# Patient Record
Sex: FEMALE | Race: WHITE | NOT HISPANIC OR LATINO
[De-identification: names, ages, dates, MRNs, and addresses within clinical notes are randomized per-mention and may not be internally consistent; named-entity substitution may affect disease eponyms.]

---

## 2017-02-16 ENCOUNTER — APPOINTMENT (OUTPATIENT)
Dept: OBGYN | Facility: CLINIC | Age: 37
End: 2017-02-16

## 2017-02-16 VITALS
DIASTOLIC BLOOD PRESSURE: 68 MMHG | WEIGHT: 159 LBS | BODY MASS INDEX: 27.14 KG/M2 | SYSTOLIC BLOOD PRESSURE: 100 MMHG | HEIGHT: 64 IN

## 2017-02-20 LAB — HPV HIGH+LOW RISK DNA PNL CVX: POSITIVE

## 2017-02-22 LAB — CYTOLOGY CVX/VAG DOC THIN PREP: NORMAL

## 2017-02-23 ENCOUNTER — APPOINTMENT (OUTPATIENT)
Dept: OBGYN | Facility: CLINIC | Age: 37
End: 2017-02-23

## 2017-03-18 ENCOUNTER — APPOINTMENT (OUTPATIENT)
Dept: OBGYN | Facility: CLINIC | Age: 37
End: 2017-03-18

## 2017-07-06 ENCOUNTER — APPOINTMENT (OUTPATIENT)
Dept: OBGYN | Facility: CLINIC | Age: 37
End: 2017-07-06

## 2017-07-15 ENCOUNTER — APPOINTMENT (OUTPATIENT)
Dept: OBGYN | Facility: CLINIC | Age: 37
End: 2017-07-15

## 2017-11-27 ENCOUNTER — RX RENEWAL (OUTPATIENT)
Age: 37
End: 2017-11-27

## 2018-03-05 ENCOUNTER — APPOINTMENT (OUTPATIENT)
Dept: OBGYN | Facility: CLINIC | Age: 38
End: 2018-03-05
Payer: COMMERCIAL

## 2018-03-05 VITALS
SYSTOLIC BLOOD PRESSURE: 110 MMHG | HEIGHT: 64 IN | DIASTOLIC BLOOD PRESSURE: 60 MMHG | WEIGHT: 165 LBS | BODY MASS INDEX: 28.17 KG/M2

## 2018-03-05 DIAGNOSIS — Z01.419 ENCOUNTER FOR GYNECOLOGICAL EXAMINATION (GENERAL) (ROUTINE) W/OUT ABNORMAL FINDINGS: ICD-10-CM

## 2018-03-05 PROCEDURE — 99395 PREV VISIT EST AGE 18-39: CPT

## 2018-03-06 LAB — HPV HIGH+LOW RISK DNA PNL CVX: DETECTED

## 2018-03-08 ENCOUNTER — TRANSCRIPTION ENCOUNTER (OUTPATIENT)
Age: 38
End: 2018-03-08

## 2018-03-09 LAB — CYTOLOGY CVX/VAG DOC THIN PREP: NORMAL

## 2018-03-19 ENCOUNTER — APPOINTMENT (OUTPATIENT)
Dept: OBGYN | Facility: CLINIC | Age: 38
End: 2018-03-19
Payer: COMMERCIAL

## 2018-03-19 ENCOUNTER — ASOB RESULT (OUTPATIENT)
Age: 38
End: 2018-03-19

## 2018-03-19 PROCEDURE — 76857 US EXAM PELVIC LIMITED: CPT

## 2018-03-19 PROCEDURE — 76830 TRANSVAGINAL US NON-OB: CPT

## 2018-04-03 ENCOUNTER — APPOINTMENT (OUTPATIENT)
Dept: OBGYN | Facility: CLINIC | Age: 38
End: 2018-04-03
Payer: COMMERCIAL

## 2018-04-03 DIAGNOSIS — N84.0 POLYP OF CORPUS UTERI: ICD-10-CM

## 2018-04-03 DIAGNOSIS — D25.9 LEIOMYOMA OF UTERUS, UNSPECIFIED: ICD-10-CM

## 2018-04-03 PROCEDURE — 58558Z: CUSTOM

## 2018-04-03 PROCEDURE — 81025 URINE PREGNANCY TEST: CPT

## 2018-04-06 LAB
HCG UR QL: NEGATIVE
QUALITY CONTROL: YES

## 2018-04-09 LAB — CORE LAB BIOPSY: NORMAL

## 2018-04-19 ENCOUNTER — APPOINTMENT (OUTPATIENT)
Dept: OBGYN | Facility: CLINIC | Age: 38
End: 2018-04-19
Payer: COMMERCIAL

## 2018-04-19 DIAGNOSIS — Z09 ENCOUNTER FOR FOLLOW-UP EXAMINATION AFTER COMPLETED TREATMENT FOR CONDITIONS OTHER THAN MALIGNANT NEOPLASM: ICD-10-CM

## 2018-04-19 PROCEDURE — 99212 OFFICE O/P EST SF 10 MIN: CPT

## 2018-05-27 ENCOUNTER — TRANSCRIPTION ENCOUNTER (OUTPATIENT)
Age: 38
End: 2018-05-27

## 2018-06-12 ENCOUNTER — RX RENEWAL (OUTPATIENT)
Age: 38
End: 2018-06-12

## 2018-10-30 ENCOUNTER — TRANSCRIPTION ENCOUNTER (OUTPATIENT)
Age: 38
End: 2018-10-30

## 2018-12-14 ENCOUNTER — OUTPATIENT (OUTPATIENT)
Dept: OUTPATIENT SERVICES | Facility: HOSPITAL | Age: 38
LOS: 1 days | End: 2018-12-14
Payer: COMMERCIAL

## 2018-12-14 VITALS — HEIGHT: 65 IN | WEIGHT: 169.32 LBS

## 2018-12-14 VITALS
HEART RATE: 78 BPM | OXYGEN SATURATION: 97 % | TEMPERATURE: 98 F | DIASTOLIC BLOOD PRESSURE: 80 MMHG | RESPIRATION RATE: 14 BRPM | HEIGHT: 65 IN | WEIGHT: 169.32 LBS | SYSTOLIC BLOOD PRESSURE: 114 MMHG

## 2018-12-14 DIAGNOSIS — M20.12 HALLUX VALGUS (ACQUIRED), LEFT FOOT: ICD-10-CM

## 2018-12-14 DIAGNOSIS — M21.6X2 OTHER ACQUIRED DEFORMITIES OF LEFT FOOT: ICD-10-CM

## 2018-12-14 DIAGNOSIS — Z01.818 ENCOUNTER FOR OTHER PREPROCEDURAL EXAMINATION: ICD-10-CM

## 2018-12-14 DIAGNOSIS — M24.575 CONTRACTURE, LEFT FOOT: ICD-10-CM

## 2018-12-14 DIAGNOSIS — M21.612 BUNION OF LEFT FOOT: ICD-10-CM

## 2018-12-14 DIAGNOSIS — M20.42 OTHER HAMMER TOE(S) (ACQUIRED), LEFT FOOT: ICD-10-CM

## 2018-12-14 LAB
ALBUMIN SERPL ELPH-MCNC: 3.8 G/DL — SIGNIFICANT CHANGE UP (ref 3.3–5)
ALP SERPL-CCNC: 81 U/L — SIGNIFICANT CHANGE UP (ref 40–120)
ALT FLD-CCNC: 66 U/L DA — HIGH (ref 10–45)
ANION GAP SERPL CALC-SCNC: 11 MMOL/L — SIGNIFICANT CHANGE UP (ref 5–17)
AST SERPL-CCNC: 37 U/L — SIGNIFICANT CHANGE UP (ref 10–40)
BILIRUB SERPL-MCNC: 0.2 MG/DL — SIGNIFICANT CHANGE UP (ref 0.2–1.2)
BUN SERPL-MCNC: 11 MG/DL — SIGNIFICANT CHANGE UP (ref 7–23)
CALCIUM SERPL-MCNC: 9.6 MG/DL — SIGNIFICANT CHANGE UP (ref 8.4–10.5)
CHLORIDE SERPL-SCNC: 104 MMOL/L — SIGNIFICANT CHANGE UP (ref 96–108)
CO2 SERPL-SCNC: 24 MMOL/L — SIGNIFICANT CHANGE UP (ref 22–31)
CREAT SERPL-MCNC: 0.64 MG/DL — SIGNIFICANT CHANGE UP (ref 0.5–1.3)
GLUCOSE SERPL-MCNC: 87 MG/DL — SIGNIFICANT CHANGE UP (ref 70–99)
HCG SERPL QL: NEGATIVE — SIGNIFICANT CHANGE UP
HCT VFR BLD CALC: 40.1 % — SIGNIFICANT CHANGE UP (ref 34.5–45)
HGB BLD-MCNC: 13.6 G/DL — SIGNIFICANT CHANGE UP (ref 11.5–15.5)
MCHC RBC-ENTMCNC: 30 PG — SIGNIFICANT CHANGE UP (ref 27–34)
MCHC RBC-ENTMCNC: 33.9 GM/DL — SIGNIFICANT CHANGE UP (ref 32–36)
MCV RBC AUTO: 88.6 FL — SIGNIFICANT CHANGE UP (ref 80–100)
PLATELET # BLD AUTO: 337 K/UL — SIGNIFICANT CHANGE UP (ref 150–400)
POTASSIUM SERPL-MCNC: 3.9 MMOL/L — SIGNIFICANT CHANGE UP (ref 3.5–5.3)
POTASSIUM SERPL-SCNC: 3.9 MMOL/L — SIGNIFICANT CHANGE UP (ref 3.5–5.3)
PROT SERPL-MCNC: 7.3 G/DL — SIGNIFICANT CHANGE UP (ref 6–8.3)
RBC # BLD: 4.52 M/UL — SIGNIFICANT CHANGE UP (ref 3.8–5.2)
RBC # FLD: 11.3 % — SIGNIFICANT CHANGE UP (ref 10.3–14.5)
SODIUM SERPL-SCNC: 139 MMOL/L — SIGNIFICANT CHANGE UP (ref 135–145)
WBC # BLD: 8.7 K/UL — SIGNIFICANT CHANGE UP (ref 3.8–10.5)
WBC # FLD AUTO: 8.7 K/UL — SIGNIFICANT CHANGE UP (ref 3.8–10.5)

## 2018-12-14 PROCEDURE — 84703 CHORIONIC GONADOTROPIN ASSAY: CPT

## 2018-12-14 PROCEDURE — G0463: CPT

## 2018-12-14 PROCEDURE — 80053 COMPREHEN METABOLIC PANEL: CPT

## 2018-12-14 PROCEDURE — 36415 COLL VENOUS BLD VENIPUNCTURE: CPT

## 2018-12-14 PROCEDURE — 85027 COMPLETE CBC AUTOMATED: CPT

## 2018-12-14 RX ORDER — SODIUM CHLORIDE 9 MG/ML
1000 INJECTION, SOLUTION INTRAVENOUS
Qty: 0 | Refills: 0 | Status: DISCONTINUED | OUTPATIENT
Start: 2018-12-21 | End: 2018-12-21

## 2018-12-14 NOTE — H&P PST ADULT - PMH
Anxiety    Bunion, left foot    Bursitis of hip  left  Depression    Fatty liver    GERD (gastroesophageal reflux disease)  corrected with diet changes  Sciatic leg pain  bilateral  Tension headache    Uterine fibroid    Uterine polyp  scheduled for polypectomy in January

## 2018-12-14 NOTE — H&P PST ADULT - MUSCULOSKELETAL
details… detailed exam no joint warmth/tender left bunion/no joint swelling/no joint erythema/no calf tenderness/normal strength/ROM intact

## 2018-12-14 NOTE — H&P PST ADULT - RS GEN PE MLT RESP DETAILS PC
respirations non-labored/no wheezes/no rales/airway patent/good air movement/clear to auscultation bilaterally/no rhonchi/breath sounds equal

## 2018-12-14 NOTE — H&P PST ADULT - FAMILY HISTORY
Father  Still living? Yes, Estimated age: 61-70  Family history of type 2 diabetes mellitus, Age at diagnosis: Age Unknown  Family history of coronary artery disease, Age at diagnosis: Age Unknown  Family history of pancreatitis, Age at diagnosis: Age Unknown     Mother  Still living? Yes, Estimated age: 61-70  Family history of osteoarthritis, Age at diagnosis: Age Unknown

## 2018-12-14 NOTE — H&P PST ADULT - HISTORY OF PRESENT ILLNESS
39 yo female presents with long history of left bunion which recently has gotten bigger and become more painful. Pain 0/10 with shoes off and at rest and 1/10 at rest, 5/10 with daily activity and 9/10 with closed shoes. Pain relieved with aleve. Received cortisone injections, last one in July with temporary relief.

## 2018-12-14 NOTE — H&P PST ADULT - ATTENDING COMMENTS
Physician Assistant Statement 12-21-18  I have personally seen and interviewed the patient. There have NOT been any changes in the patient's history or review of systems since PMD visit.

## 2018-12-14 NOTE — H&P PST ADULT - NSANTHOSAYNRD_GEN_A_CORE
No. TIFFANY screening performed.  STOP BANG Legend: 0-2 = LOW Risk; 3-4 = INTERMEDIATE Risk; 5-8 = HIGH Risk/neck 14.5 inches

## 2018-12-14 NOTE — H&P PST ADULT - PROBLEM SELECTOR PLAN 1
left foot maggie akin, tenotomy and capsulotomy digits 2,3,4,5. medical clearance requested. pepcid and surgical wash as directed. stop aleve

## 2018-12-20 ENCOUNTER — TRANSCRIPTION ENCOUNTER (OUTPATIENT)
Age: 38
End: 2018-12-20

## 2018-12-21 ENCOUNTER — RESULT REVIEW (OUTPATIENT)
Age: 38
End: 2018-12-21

## 2018-12-21 ENCOUNTER — OUTPATIENT (OUTPATIENT)
Dept: OUTPATIENT SERVICES | Facility: HOSPITAL | Age: 38
LOS: 1 days | End: 2018-12-21
Payer: COMMERCIAL

## 2018-12-21 VITALS
DIASTOLIC BLOOD PRESSURE: 77 MMHG | HEART RATE: 67 BPM | WEIGHT: 169.32 LBS | RESPIRATION RATE: 16 BRPM | OXYGEN SATURATION: 97 % | HEIGHT: 65 IN | SYSTOLIC BLOOD PRESSURE: 126 MMHG | TEMPERATURE: 98 F

## 2018-12-21 VITALS
RESPIRATION RATE: 16 BRPM | SYSTOLIC BLOOD PRESSURE: 120 MMHG | TEMPERATURE: 97 F | DIASTOLIC BLOOD PRESSURE: 72 MMHG | OXYGEN SATURATION: 96 % | HEART RATE: 66 BPM

## 2018-12-21 DIAGNOSIS — M20.42 OTHER HAMMER TOE(S) (ACQUIRED), LEFT FOOT: ICD-10-CM

## 2018-12-21 DIAGNOSIS — M21.6X2 OTHER ACQUIRED DEFORMITIES OF LEFT FOOT: ICD-10-CM

## 2018-12-21 DIAGNOSIS — M20.12 HALLUX VALGUS (ACQUIRED), LEFT FOOT: ICD-10-CM

## 2018-12-21 DIAGNOSIS — M24.575 CONTRACTURE, LEFT FOOT: ICD-10-CM

## 2018-12-21 PROCEDURE — 88304 TISSUE EXAM BY PATHOLOGIST: CPT

## 2018-12-21 PROCEDURE — 28299 COR HLX VLGS DOUBLE OSTEOT: CPT | Mod: LT

## 2018-12-21 PROCEDURE — 28272 RELEASE OF TOE JOINT EACH: CPT | Mod: T4

## 2018-12-21 PROCEDURE — C1713: CPT

## 2018-12-21 PROCEDURE — 73620 X-RAY EXAM OF FOOT: CPT

## 2018-12-21 PROCEDURE — 73620 X-RAY EXAM OF FOOT: CPT | Mod: 26,LT

## 2018-12-21 PROCEDURE — C1889: CPT

## 2018-12-21 PROCEDURE — 28270 RELEASE OF FOOT CONTRACTURE: CPT | Mod: T1

## 2018-12-21 PROCEDURE — 88304 TISSUE EXAM BY PATHOLOGIST: CPT | Mod: 26

## 2018-12-21 RX ORDER — SODIUM CHLORIDE 9 MG/ML
1000 INJECTION, SOLUTION INTRAVENOUS
Qty: 0 | Refills: 0 | Status: DISCONTINUED | OUTPATIENT
Start: 2018-12-21 | End: 2018-12-21

## 2018-12-21 RX ADMIN — SODIUM CHLORIDE 50 MILLILITER(S): 9 INJECTION, SOLUTION INTRAVENOUS at 07:47

## 2018-12-21 NOTE — ASU DISCHARGE PLAN (ADULT/PEDIATRIC). - CONDITIONS AT DISCHARGE
denies nausea,vomiting,or pain. Discharged in stable condition. denies nausea ,vomiting, or pain. Discharged in stable condition.

## 2018-12-21 NOTE — ASU DISCHARGE PLAN (ADULT/PEDIATRIC). - NOTIFY
Bleeding that does not stop Swelling that continues/Numbness, color, or temperature change to extremity/Pain not relieved by Medications/Fever greater than 101/Persistent Nausea and Vomiting/Bleeding that does not stop/Inability to Tolerate Liquids or Foods

## 2018-12-21 NOTE — BRIEF OPERATIVE NOTE - PROCEDURE
<<-----Click on this checkbox to enter Procedure Bunionectomy of left great toe with chevron osteotomy  12/21/2018    Active  JAYDEN

## 2018-12-21 NOTE — ASU DISCHARGE PLAN (ADULT/PEDIATRIC). - NURSING INSTRUCTIONS
All discharge,safety,follow up care to MD. Elevate extremity and use ice as instructed by surgeon. All discharge, safety, follow up care to MD. Elevate extremity and use ice as instructed by surgeon.

## 2018-12-21 NOTE — ASU DISCHARGE PLAN (ADULT/PEDIATRIC). - ACTIVITY LEVEL
no heavy lifting/weight bearing as tolerated/no exercise no exercise/elevate extremity/weight bearing as tolerated/no heavy lifting

## 2018-12-21 NOTE — BRIEF OPERATIVE NOTE - PRE-OP DX
Hallux valgus (acquired), left foot  12/21/2018    Active  Jered Larson  Hammertoe of left foot  12/21/2018    Active  Jered Larson

## 2018-12-26 LAB — SURGICAL PATHOLOGY STUDY: SIGNIFICANT CHANGE UP

## 2019-01-02 PROBLEM — K21.9 GASTRO-ESOPHAGEAL REFLUX DISEASE WITHOUT ESOPHAGITIS: Chronic | Status: ACTIVE | Noted: 2018-12-14

## 2019-01-02 PROBLEM — M70.70 OTHER BURSITIS OF HIP, UNSPECIFIED HIP: Chronic | Status: ACTIVE | Noted: 2018-12-14

## 2019-01-02 PROBLEM — K76.0 FATTY (CHANGE OF) LIVER, NOT ELSEWHERE CLASSIFIED: Chronic | Status: ACTIVE | Noted: 2018-12-14

## 2019-01-02 PROBLEM — M21.612 BUNION OF LEFT FOOT: Chronic | Status: ACTIVE | Noted: 2018-12-14

## 2019-01-02 PROBLEM — F32.9 MAJOR DEPRESSIVE DISORDER, SINGLE EPISODE, UNSPECIFIED: Chronic | Status: ACTIVE | Noted: 2018-12-14

## 2019-01-02 PROBLEM — M54.30 SCIATICA, UNSPECIFIED SIDE: Chronic | Status: ACTIVE | Noted: 2018-12-14

## 2019-01-02 PROBLEM — D25.9 LEIOMYOMA OF UTERUS, UNSPECIFIED: Chronic | Status: ACTIVE | Noted: 2018-12-14

## 2019-01-02 PROBLEM — G44.209 TENSION-TYPE HEADACHE, UNSPECIFIED, NOT INTRACTABLE: Chronic | Status: ACTIVE | Noted: 2018-12-14

## 2019-01-02 PROBLEM — N84.0 POLYP OF CORPUS UTERI: Chronic | Status: ACTIVE | Noted: 2018-12-14

## 2019-01-08 ENCOUNTER — OUTPATIENT (OUTPATIENT)
Dept: OUTPATIENT SERVICES | Facility: HOSPITAL | Age: 39
LOS: 1 days | End: 2019-01-08
Payer: COMMERCIAL

## 2019-01-08 VITALS
HEART RATE: 85 BPM | SYSTOLIC BLOOD PRESSURE: 108 MMHG | DIASTOLIC BLOOD PRESSURE: 70 MMHG | OXYGEN SATURATION: 98 % | WEIGHT: 173.94 LBS | RESPIRATION RATE: 14 BRPM | TEMPERATURE: 98 F | HEIGHT: 64 IN

## 2019-01-08 DIAGNOSIS — N84.0 POLYP OF CORPUS UTERI: ICD-10-CM

## 2019-01-08 DIAGNOSIS — Z01.818 ENCOUNTER FOR OTHER PREPROCEDURAL EXAMINATION: ICD-10-CM

## 2019-01-08 DIAGNOSIS — Z98.890 OTHER SPECIFIED POSTPROCEDURAL STATES: Chronic | ICD-10-CM

## 2019-01-08 LAB — HCG SERPL-ACNC: <1 MIU/ML — SIGNIFICANT CHANGE UP

## 2019-01-08 PROCEDURE — 84702 CHORIONIC GONADOTROPIN TEST: CPT

## 2019-01-08 PROCEDURE — 86901 BLOOD TYPING SEROLOGIC RH(D): CPT

## 2019-01-08 PROCEDURE — 86850 RBC ANTIBODY SCREEN: CPT

## 2019-01-08 PROCEDURE — G0463: CPT

## 2019-01-08 PROCEDURE — 86900 BLOOD TYPING SEROLOGIC ABO: CPT

## 2019-01-08 PROCEDURE — 36415 COLL VENOUS BLD VENIPUNCTURE: CPT

## 2019-01-08 RX ORDER — ERGOCALCIFEROL 1.25 MG/1
1 CAPSULE ORAL
Qty: 0 | Refills: 0 | COMMUNITY

## 2019-01-08 NOTE — H&P PST ADULT - ACTIVITY
Yoga 5 times a week - also goes to the gym 1-2 times a week cardio for 30 min then weights for half hour

## 2019-01-08 NOTE — H&P PST ADULT - PMH
Anxiety and depression    Bunion, left foot    Bursitis of hip  left  Depression    Dry eyes, bilateral  Uses Natural Tears Daily in AM  Fatty liver    GERD (gastroesophageal reflux disease)  corrected with diet changes  Polyp of corpus uteri    Sciatic leg pain  bilateral  Tension headache    Uterine fibroid    Uterine polyp  scheduled for polypectomy in January

## 2019-01-08 NOTE — H&P PST ADULT - ASSESSMENT
38 year old female with Polyp of Corpus Uteri - Scheduled for Hysteroscopic Polypectomy Using Myosure Device with Dr Lyle Hodge on 1/11/2019

## 2019-01-08 NOTE — H&P PST ADULT - HISTORY OF PRESENT ILLNESS
38 year old female  presents for PST prior to Hysteroscopic Polypectomy Using Myosure Device with Dr Lyle Hodge on 19 - pt notes H/O Enlarged Uterine Fibroid so she notes she started seeing him to get a handle on that as she would like to have a baby in the future - pt notes during sonogram testing Uterine Polyp was also diagnosed - following exam and discussions/options pt is electing for scheduled procedure. 38 year old female  presents for PST prior to Hysteroscopic Polypectomy Using Myosure Device with Dr Lyle Hodge on 19 - pt notes H/O Enlarged Uterine Fibroid so she notes she started seeing him to get a handle on that as she would like to have a baby in the future - pt notes during sonohysterogram testing Uterine Polyp was also diagnosed - following exam and discussions/options pt is electing for scheduled procedure.
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
## 2019-01-09 PROBLEM — F41.9 ANXIETY DISORDER, UNSPECIFIED: Chronic | Status: INACTIVE | Noted: 2018-12-14 | Resolved: 2019-01-08

## 2019-01-10 ENCOUNTER — TRANSCRIPTION ENCOUNTER (OUTPATIENT)
Age: 39
End: 2019-01-10

## 2019-01-10 RX ORDER — SODIUM CHLORIDE 9 MG/ML
1000 INJECTION, SOLUTION INTRAVENOUS
Qty: 0 | Refills: 0 | Status: DISCONTINUED | OUTPATIENT
Start: 2019-01-11 | End: 2019-01-26

## 2019-01-11 ENCOUNTER — RESULT REVIEW (OUTPATIENT)
Age: 39
End: 2019-01-11

## 2019-01-11 ENCOUNTER — OUTPATIENT (OUTPATIENT)
Dept: OUTPATIENT SERVICES | Facility: HOSPITAL | Age: 39
LOS: 1 days | End: 2019-01-11
Payer: COMMERCIAL

## 2019-01-11 VITALS
SYSTOLIC BLOOD PRESSURE: 135 MMHG | HEART RATE: 70 BPM | RESPIRATION RATE: 15 BRPM | DIASTOLIC BLOOD PRESSURE: 78 MMHG | OXYGEN SATURATION: 97 % | TEMPERATURE: 98 F

## 2019-01-11 VITALS
RESPIRATION RATE: 16 BRPM | HEIGHT: 64 IN | OXYGEN SATURATION: 98 % | WEIGHT: 173.94 LBS | HEART RATE: 71 BPM | DIASTOLIC BLOOD PRESSURE: 77 MMHG | SYSTOLIC BLOOD PRESSURE: 127 MMHG | TEMPERATURE: 98 F

## 2019-01-11 DIAGNOSIS — Z01.818 ENCOUNTER FOR OTHER PREPROCEDURAL EXAMINATION: ICD-10-CM

## 2019-01-11 DIAGNOSIS — N84.0 POLYP OF CORPUS UTERI: ICD-10-CM

## 2019-01-11 DIAGNOSIS — Z98.890 OTHER SPECIFIED POSTPROCEDURAL STATES: Chronic | ICD-10-CM

## 2019-01-11 PROCEDURE — 58558 HYSTEROSCOPY BIOPSY: CPT

## 2019-01-11 PROCEDURE — 88305 TISSUE EXAM BY PATHOLOGIST: CPT | Mod: 26

## 2019-01-11 RX ORDER — SODIUM CHLORIDE 9 MG/ML
1000 INJECTION, SOLUTION INTRAVENOUS
Qty: 0 | Refills: 0 | Status: DISCONTINUED | OUTPATIENT
Start: 2019-01-11 | End: 2019-01-11

## 2019-01-11 RX ORDER — ALPRAZOLAM 0.25 MG
1 TABLET ORAL
Qty: 0 | Refills: 0 | COMMUNITY

## 2019-01-11 RX ORDER — KETOROLAC TROMETHAMINE 30 MG/ML
30 SYRINGE (ML) INJECTION ONCE
Qty: 0 | Refills: 0 | Status: DISCONTINUED | OUTPATIENT
Start: 2019-01-11 | End: 2019-01-11

## 2019-01-11 RX ORDER — METOCLOPRAMIDE HCL 10 MG
10 TABLET ORAL ONCE
Qty: 0 | Refills: 0 | Status: DISCONTINUED | OUTPATIENT
Start: 2019-01-11 | End: 2019-01-11

## 2019-01-11 RX ORDER — ONDANSETRON 8 MG/1
4 TABLET, FILM COATED ORAL ONCE
Qty: 0 | Refills: 0 | Status: DISCONTINUED | OUTPATIENT
Start: 2019-01-11 | End: 2019-01-11

## 2019-01-11 RX ORDER — IBUPROFEN 200 MG
1 TABLET ORAL
Qty: 0 | Refills: 0 | COMMUNITY

## 2019-01-11 RX ORDER — ERGOCALCIFEROL 1.25 MG/1
1 CAPSULE ORAL
Qty: 0 | Refills: 0 | COMMUNITY

## 2019-01-11 RX ORDER — HYDROMORPHONE HYDROCHLORIDE 2 MG/ML
0.5 INJECTION INTRAMUSCULAR; INTRAVENOUS; SUBCUTANEOUS
Qty: 0 | Refills: 0 | Status: DISCONTINUED | OUTPATIENT
Start: 2019-01-11 | End: 2019-01-11

## 2019-01-11 RX ORDER — ESCITALOPRAM OXALATE 10 MG/1
1 TABLET, FILM COATED ORAL
Qty: 0 | Refills: 0 | COMMUNITY

## 2019-01-11 RX ORDER — L.ACIDOPH/B.ANIMALIS/B.LONGUM 15B CELL
1 CAPSULE ORAL
Qty: 0 | Refills: 0 | COMMUNITY

## 2019-01-11 RX ADMIN — SODIUM CHLORIDE 75 MILLILITER(S): 9 INJECTION, SOLUTION INTRAVENOUS at 13:00

## 2019-01-11 RX ADMIN — SODIUM CHLORIDE 75 MILLILITER(S): 9 INJECTION, SOLUTION INTRAVENOUS at 16:00

## 2019-01-11 NOTE — ASU PATIENT PROFILE, ADULT - PASSIVE COMMENT
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     Communication Preference: Please call mom to advise --- George Bryan [phone]    Additional Information:   Boyfriend Smokes

## 2019-01-11 NOTE — BRIEF OPERATIVE NOTE - PROCEDURE
<<-----Click on this checkbox to enter Procedure Hysteroscopic polypectomy using MyoSure tissue removal system  01/11/2019    Active  GZAPANTIS

## 2019-01-15 LAB — SURGICAL PATHOLOGY STUDY: SIGNIFICANT CHANGE UP

## 2019-01-16 ENCOUNTER — RESULT REVIEW (OUTPATIENT)
Age: 39
End: 2019-01-16

## 2019-10-23 PROBLEM — N84.0 POLYP OF CORPUS UTERI: Chronic | Status: ACTIVE | Noted: 2019-01-08

## 2019-10-23 PROBLEM — H04.123 DRY EYE SYNDROME OF BILATERAL LACRIMAL GLANDS: Chronic | Status: ACTIVE | Noted: 2019-01-08

## 2019-10-23 PROBLEM — F41.9 ANXIETY DISORDER, UNSPECIFIED: Chronic | Status: ACTIVE | Noted: 2019-01-08

## 2019-11-14 ENCOUNTER — APPOINTMENT (OUTPATIENT)
Dept: DERMATOLOGY | Facility: CLINIC | Age: 39
End: 2019-11-14

## 2019-12-14 NOTE — H&P PST ADULT - NS HIV RISK FACTOR
Patient comes in with right leg pain that started 1 hour pta. Patient denies trauma to area. No obvious deformities or swelling at triage. Patient ambulatory. No

## 2020-03-18 ENCOUNTER — APPOINTMENT (OUTPATIENT)
Dept: DERMATOLOGY | Facility: CLINIC | Age: 40
End: 2020-03-18

## 2020-05-29 ENCOUNTER — TRANSCRIPTION ENCOUNTER (OUTPATIENT)
Age: 40
End: 2020-05-29

## 2020-06-20 ENCOUNTER — TRANSCRIPTION ENCOUNTER (OUTPATIENT)
Age: 40
End: 2020-06-20

## 2021-02-09 ENCOUNTER — APPOINTMENT (OUTPATIENT)
Dept: DERMATOLOGY | Facility: CLINIC | Age: 41
End: 2021-02-09

## 2021-03-05 NOTE — ASU PATIENT PROFILE, ADULT - NSSUBSTANCEUSE_GEN_ALL_CORE_SD
Working with infusion center to give patient 1 unit of PRBCs either today or tomorrow before dialysis  Patient currently at infusion center receiving chemo  denied illicit drug/caffeine

## 2021-05-05 NOTE — H&P PST ADULT - PROBLEM SELECTOR PLAN 1
Detail Level: Detailed Depth Of Biopsy: dermis Was A Bandage Applied: Yes Size Of Lesion In Cm: 0.3 X Size Of Lesion In Cm: 0 Biopsy Type: H and E Biopsy Method: Personna blade Anesthesia Type: 1% lidocaine with epinephrine Anesthesia Volume In Cc (Will Not Render If 0): 0.5 Hemostasis: Barby's Wound Care: Bacitracin Dressing: bandage Destruction After The Procedure: No Type Of Destruction Used: Curettage Curettage Text: The wound bed was treated with curettage after the biopsy was performed. Cryotherapy Text: The wound bed was treated with cryotherapy after the biopsy was performed. Electrodesiccation Text: The wound bed was treated with electrodesiccation after the biopsy was performed. Electrodesiccation And Curettage Text: The wound bed was treated with electrodesiccation and curettage after the biopsy was performed. Silver Nitrate Text: The wound bed was treated with silver nitrate after the biopsy was performed. Lab: 6 Lab Facility: 3 Consent: Written consent was obtained and risks were reviewed including but not limited to scarring, infection, bleeding, scabbing, incomplete removal, nerve damage and allergy to anesthesia. Post-Care Instructions: I reviewed with the patient in detail post-care instructions. Patient is to keep the biopsy site dry overnight, and then apply Vaseline twice daily and keep covered with band-aid until healed. Notification Instructions: Patient will be notified of biopsy results. However, patient instructed to call the office if not contacted within 2-3 weeks. Billing Type: Third-Party Bill Information: Selecting Yes will display possible errors in your note based on the variables you have selected. This validation is only offered as a suggestion for you. PLEASE NOTE THAT THE VALIDATION TEXT WILL BE REMOVED WHEN YOU FINALIZE YOUR NOTE. IF YOU WANT TO FAX A PRELIMINARY NOTE YOU WILL NEED TO TOGGLE THIS TO 'NO' IF YOU DO NOT WANT IT IN YOUR FAXED NOTE. Size Of Lesion In Cm: 0.2 [facility] (CBC/BMP on chart from 12/14/18 - done prior to Bunionectomy) - HcG, Type & Screen obtained here today at PST - No Medical Clearance needed - Pt instructed to stop Multivitamin, Probiotic and Ibuprofen -May take Tylenol as needed for pain - Instructed pt to take Lexapro and if needed her Xanax morning of procedure with small sip of water - Pre-op instructions given to pt with understanding verbalized (reinforced with pt to remove nail polish and all jewelery/body piercings- understanding verbalized)

## 2021-05-27 ENCOUNTER — TRANSCRIPTION ENCOUNTER (OUTPATIENT)
Age: 41
End: 2021-05-27

## 2021-11-24 NOTE — ASU DISCHARGE PLAN (ADULT/PEDIATRIC). - =======================================================================
Arm swelling due to dog bite Arm swelling due to dog bite Arm swelling due to dog bite Arm swelling due to dog bite Arm swelling due to dog bite Arm swelling due to dog bite Arm swelling due to dog bite Arm swelling due to dog bite Arm swelling due to dog bite Arm swelling due to dog bite Arm swelling due to dog bite Arm swelling due to dog bite Arm swelling due to dog bite Arm swelling due to dog bite Arm swelling due to dog bite Arm swelling due to dog bite Arm swelling due to dog bite Arm swelling due to dog bite Arm swelling due to dog bite Arm swelling due to dog bite Arm swelling due to dog bite Arm swelling due to dog bite Arm swelling due to dog bite Arm swelling due to dog bite Statement Selected

## 2022-04-25 NOTE — ASU PATIENT PROFILE, ADULT - REASON FOR ADMISSION, PROFILE
"Left foot bunionectomy" Render Note In Bullet Format When Appropriate: Yes Number Of Freeze-Thaw Cycles: 3 freeze-thaw cycles Detail Level: Simple Post-Care Instructions: I reviewed with the patient in detail post-care instructions. Patient is to wear sunprotection, and avoid picking at any of the treated lesions. Pt may apply Vaseline to crusted or scabbing areas. Patient has also received a handout with instructions on caring for the wound and office contact information. Consent: The patient's consent was obtained including but not limited to risks of crusting, scabbing, blistering, scarring, darker or lighter pigmentary change, recurrence, incomplete removal and infection. Duration Of Freeze Thaw-Cycle (Seconds): 3

## 2022-06-20 NOTE — H&P PST ADULT - NS PRO LACT YNNA
"[facility]  Physical Therapy Initial Evaluation    Name: Etta Roy  Clinic Number: [id number]    Therapy Diagnosis:   Encounter Diagnoses   Name Primary?    Closed fracture of proximal end of right humerus with routine healing, subsequent encounter     Decreased right shoulder range of motion     Decreased strength of upper extremity     Posture abnormality      Physician: Dwain Galaviz MD    Physician Orders: PT Eval and Treat "Begin tonny PROM"  Medical Diagnosis from Referral: S42.201D (ICD-10-CM) - Closed fracture of proximal end of right humerus with routine healing, subsequent encounter  Evaluation Date: 6/20/2022  Authorization Period Expiration: 06/14/2023  Plan of Care Expiration: 08/15/2022  Progress Note Due: 07/18/2022  Visit # / Visits authorized: 1/ 1   FOTO: 1/10    Precautions: Standard     Time In: 0700 am  Time Out: 0745 am  Total Appointment Time (timed & untimed codes): 45 minutes (1 LCE, 1 TE)    DOS: 4/28/22 ORIF of proximal humerus    SUBJECTIVE     Date of onset: fell on 4/22, sx of 4/28    History of current condition - Etta reports: in mid April she slipped on blanket and fell to the floor landing on her R shoulder. She sustained a humerus fracture and had this surgically repaired. Since her surgery she has had issues with non-healing incision site and some sort of allergic reaction to a medication that left her with a rash for multiple weeks. Both of these issues has since resolved and she presents to therapy to begin regaining her function post-operatively. She reports that she has minimal pain in her shoulder at this time unless she attempts to reach overhead, however, she has been able to hold her purse and occasionally carry grocery bags with that hand without issue.    Falls: 4/22 resulting in humerus fx    Imaging, X-Ray: Impression: "ORIF right proximal humerus fracture.  Significant inferior displacement of the humeral head relative " "to the glenoid could be due to a joint effusion or neuropathic joint, to correlate clinically."    Prior Therapy: yes but > 10 years ago  Social History:  lives with their spouse  Occuption: retired   Prior Level of Function: no limitations   Current Level of Function:  difficulty with fixing her hair    Pain:  Current 1/10, worst 1/10, best 10   Location: right shoulder    Description: Dull  Aggravating Factors: lying on that arm, trying to do hair,   Easing Factors: ice    Patients goals: to be able to use her arm well again     Medical History:   Past Medical History:   Diagnosis Date    Depression     Diabetes mellitus     Hyperlipidemia     Hypertension     Osteopenia        Surgical History:   Etta Irwin  has a past surgical history that includes  section; Cholecystectomy; Knee Arthroplasty (Left, 2021); Open reduction and internal fixation (ORIF) of fracture of proximal humerus (Right, 2022); and Colonoscopy (N/A, 2022).    Medications:   Etta has a current medication list which includes the following prescription(s): acetaminophen, alendronate, amitriptyline, amlodipine, aspirin, atorvastatin, diphenhydramine, escitalopram oxalate, famotidine, fish oil-omega-3 fatty acids, levothyroxine, lisinopril, metformin, multivitamin, pioglitazone, vitamin d, and vitamin e.    Allergies:   Review of patient's allergies indicates:  No Known Allergies     Objective     Posture:  Elevated right shoulder, B rounded shoulders, moderate FHP    Passive Range of Motion:   Shoulder Right   Scaption 95 deg *   Abduction 85 deg *   ER at 0 20 deg *   ER at 90 32 deg *   IR 50 deg *      Active Range of Motion:   Shoulder Right Left   Flexion 60 deg * 165 deg   Abduction 40 deg * 168 deg   ER (behind head) 0 * C6   IR (behind back) Lateral hip T10     Upper Extremity Strength:    Shoulder Left   Flexion 4-/5   Abduction 4-/5   ER 4-/5   IR 4-/5        Joint Mobility: decreased " "posterior glide w/ humeral head sitting somewhat inferior     Palpation: somewhat TTP around incision site        CMS Impairment/Limitation/Restriction for FOTO Upper Arm Survey    Therapist reviewed FOTO scores for tEta Irwin on 6/20/2022.   FOTO documents entered into [employer] - see Media section.    Limitation Score: 35%  Predicted Score: 27%         TREATMENT   Treatment Time In: 0725 am  Treatment Time Out: 0745 am  Total Treatment time separate from Evaluation: 20 minutes    Etta received the treatments listed below:      THERAPEUTIC EXERCISES to develop strength, endurance, ROM, flexibility, posture and core stabilization for 15 minutes including :    - seated scap retractions; 20 x w/ 5" hold  - table slides: flexion, scaption, abduction; 3 min ea       Home Exercises and Patient Education Provided    Education provided re: diagnosis/prognosis, goals for therapy, role of therapy for care, exercises/HEP    Written Home Exercises Provided: yes.  Exercises were reviewed and Etta was able to demonstrate them prior to the end of the session.   Pt received a written copy of exercises to perform at home. Etta demonstrated good  understanding of the education provided.     See EMR under patient instructions for exercises given.   Assessment   Etta is a 62 y.o. female referred to outpatient Physical Therapy with a medical diagnosis of closed fracture of proximal end of right humerus with routine healing. Pt presents with decreased strength, range of motion, and pain consistent with this stage post-operatively. She is also limited in functional activities such as dressing, grooming, and reaching overhead. She will benefit skilled OP PT to address the above concerns and improving her functional independence.    Pt prognosis is Good.   Pt will benefit from skilled outpatient Physical Therapy to address the deficits stated above and in the chart below, provide pt/family education, and to maximize pt's level of " independence.     Plan of care discussed with patient: Yes  Pt's spiritual, cultural and educational needs considered and patient is agreeable to the plan of care and goals as stated below:     Anticipated Barriers for therapy: co-morbidities, time since sx    Medical Necessity is demonstrated by the following  History  Co-morbidities and personal factors that may impact the plan of care Co-morbidities:   depression, diabetes, HTN and osteopenia    Personal Factors:   no deficits     high   Examination  Body Structures and Functions, activity limitations and participation restrictions that may impact the plan of care Body Regions:   upper extremities    Body Systems:    gross symmetry  ROM  strength  gross coordinated movement  transfers  transitions  motor control  motor learning    Participation Restrictions:    ability to reach overhead    Activity limitations:   Learning and applying knowledge  no deficits    General Tasks and Commands  no deficits    Communication  no deficits    Mobility  lifting and carrying objects  driving (bike, car, motorcycle)    Self care  washing oneself (bathing, drying, washing hands)  caring for body parts (brushing teeth, shaving, grooming)  dressing    Domestic Life  doing house work (cleaning house, washing dishes, laundry)    Interactions/Relationships  no deficits    Life Areas  no deficits    Community and Social Life  community life  recreation and leisure         high   Clinical Presentation stable and uncomplicated low   Decision Making/ Complexity Score: low     Goals:  Short-Term Goals: 4 weeks  - The patient will be independent with initial home exercise program.  - The patient will demonstrate good unsupported sitting posture with min verbal cues for 15 minutes.  - The patient will increase ROM 15 degrees to perform bathing and hygiene, grooming, toileting and dressing with pain < 010.  - The patient will increase strength by 1.2 muscle grade  to perform bathing  and hygiene, grooming, toileting and dressing with pain < 0/10.    Long-Term Goals: 8 weeks  - The patient will be independent with home exercise program and symptom management.  - The patient will increase ROM = to uninvolved shoulder  to perform work duties and recreation/leisure activities   with pain < 0/10.  - The patient will increase strength = to uninvolved shoulder  to perform work duties and recreation/leisure activities   with pain < 0/10.      Plan   Plan of care Certification: 6/20/2022 to 07/15/2022.    Outpatient Physical Therapy 2 times weekly for 8 weeks to include the following interventions: Aquatic Therapy, Manual Therapy, Moist Heat/ Ice, Neuromuscular Re-ed, Patient Education, Therapeutic Activities and Therapeutic Exercise.     Val Tovar PT, DPT           no

## 2022-12-07 ENCOUNTER — NON-APPOINTMENT (OUTPATIENT)
Age: 42
End: 2022-12-07

## 2022-12-28 ENCOUNTER — OUTPATIENT (OUTPATIENT)
Dept: OUTPATIENT SERVICES | Facility: HOSPITAL | Age: 42
LOS: 1 days | End: 2022-12-28
Payer: COMMERCIAL

## 2022-12-28 ENCOUNTER — APPOINTMENT (OUTPATIENT)
Dept: RADIOLOGY | Facility: CLINIC | Age: 42
End: 2022-12-28
Payer: COMMERCIAL

## 2022-12-28 ENCOUNTER — APPOINTMENT (OUTPATIENT)
Dept: PULMONOLOGY | Facility: CLINIC | Age: 42
End: 2022-12-28

## 2022-12-28 VITALS
HEIGHT: 64 IN | OXYGEN SATURATION: 98 % | WEIGHT: 140 LBS | BODY MASS INDEX: 23.9 KG/M2 | DIASTOLIC BLOOD PRESSURE: 64 MMHG | HEART RATE: 96 BPM | SYSTOLIC BLOOD PRESSURE: 118 MMHG | RESPIRATION RATE: 16 BRPM

## 2022-12-28 DIAGNOSIS — Z78.9 OTHER SPECIFIED HEALTH STATUS: ICD-10-CM

## 2022-12-28 DIAGNOSIS — F12.91 CANNABIS USE, UNSPECIFIED, IN REMISSION: ICD-10-CM

## 2022-12-28 DIAGNOSIS — R05.9 COUGH, UNSPECIFIED: ICD-10-CM

## 2022-12-28 PROCEDURE — 71046 X-RAY EXAM CHEST 2 VIEWS: CPT | Mod: 26

## 2022-12-28 PROCEDURE — 71046 X-RAY EXAM CHEST 2 VIEWS: CPT

## 2022-12-28 PROCEDURE — 99204 OFFICE O/P NEW MOD 45 MIN: CPT | Mod: 25

## 2022-12-28 PROCEDURE — 94010 BREATHING CAPACITY TEST: CPT

## 2022-12-28 RX ORDER — NORGESTIMATE AND ETHINYL ESTRADIOL 7DAYSX3 LO
0.18/0.215/0.25 KIT ORAL
Qty: 84 | Refills: 1 | Status: DISCONTINUED | COMMUNITY
Start: 2018-06-12 | End: 2022-12-28

## 2022-12-28 RX ORDER — BUPROPION HYDROCHLORIDE 100 MG/1
TABLET, FILM COATED ORAL
Refills: 0 | Status: ACTIVE | COMMUNITY

## 2022-12-28 RX ORDER — NORGESTIMATE AND ETHINYL ESTRADIOL 7DAYSX3 LO
0.18/0.215/0.25 KIT ORAL
Qty: 1 | Refills: 1 | Status: DISCONTINUED | COMMUNITY
Start: 2017-11-27 | End: 2022-12-28

## 2022-12-28 RX ORDER — SEMAGLUTIDE 1.7 MG/.75ML
1.7 INJECTION, SOLUTION SUBCUTANEOUS
Refills: 0 | Status: ACTIVE | COMMUNITY

## 2022-12-28 NOTE — REVIEW OF SYSTEMS
[Cough] : cough [Chest Tightness] : chest tightness [Wheezing] : wheezing [SOB on Exertion] : sob on exertion [Back Pain] : back pain [Depression] : depression [Anxiety] : anxiety [Negative] : Endocrine

## 2022-12-28 NOTE — DISCUSSION/SUMMARY
[FreeTextEntry1] : \par #1. Spirometry today was essentially normal.\par #2. The patient does not appear to require chronic BD therapy at this time\par #3. Albuterol as needed\par #4. Pt should refrain from smoking, vapping, and marijuana use\par #5. On anxiolytics\par #6. Empiric abx and prednisone taper for possible URI\par #7. CXR to further evaluate cough; consider CT if negative and if cough persists\par #8. S/p Covid infection\par #9. F/u in 1 month with CXR\par #10. Consider further ENT evaluation for nasal/sinus causes of cough\par #11. Reviewed risks of exposure and symptoms of Covid-19 virus, including how the virus is spread and precautions to avoid vianey virus. \par \par The patient expressed understanding and agreement with the above recommendations/plan and accepts responsibility to be compliant with recommended testing, therapies, and f/u visits.\par All relevant questions and concerns were addressed.

## 2022-12-28 NOTE — HISTORY OF PRESENT ILLNESS
[Former] : former [TextBox_4] : S/p Covid infection 1/2022 with rhinorrhea but did not require therapy.\par Pt with cough and SOB for 2 months and was started on Albuterol therapy as needed with some improvement initially but is no longer helping. She was given steroids for 5 days without improvement but was not given abx.\par Pt had chronic cough even prior to current URI symptoms.\par H/o Flonase use for PNDS [TextBox_11] : 1 [TextBox_13] : 25 [YearQuit] : 2022

## 2022-12-28 NOTE — REASON FOR VISIT
[Initial] : an initial visit [Cough] : cough [Shortness of Breath] : shortness of breath [Wheezing] : wheezing [TextBox_44] : Prior Covid infection

## 2022-12-28 NOTE — CONSULT LETTER
[Dear  ___] : Dear  [unfilled], [Consult Letter:] : I had the pleasure of evaluating your patient, [unfilled]. [Please see my note below.] : Please see my note below. [Consult Closing:] : Thank you very much for allowing me to participate in the care of this patient.  If you have any questions, please do not hesitate to contact me. [Sincerely,] : Sincerely, [FreeTextEntry3] : Heber Childs MD, FCCP, D. ABSM\par Pulmonary and Sleep Medicine\par [facility] at [place]

## 2023-01-20 ENCOUNTER — RX RENEWAL (OUTPATIENT)
Age: 43
End: 2023-01-20

## 2023-03-10 ENCOUNTER — OFFICE (OUTPATIENT)
Dept: URBAN - METROPOLITAN AREA CLINIC 115 | Facility: CLINIC | Age: 43
Setting detail: OPHTHALMOLOGY
End: 2023-03-10
Payer: COMMERCIAL

## 2023-03-10 DIAGNOSIS — G43.009: ICD-10-CM

## 2023-03-10 DIAGNOSIS — H16.223: ICD-10-CM

## 2023-03-10 DIAGNOSIS — D31.32: ICD-10-CM

## 2023-03-10 PROCEDURE — 92014 COMPRE OPH EXAM EST PT 1/>: CPT | Performed by: OPTOMETRIST

## 2023-03-10 ASSESSMENT — SUPERFICIAL PUNCTATE KERATITIS (SPK)
OD_SPK: 1+
OS_SPK: 1+

## 2023-03-10 ASSESSMENT — REFRACTION_MANIFEST
OU_VA: 20/20
OS_AXIS: 160
OS_SPHERE: +2.50
OD_SPHERE: +2.25
OD_AXIS: 015
OD_SPHERE: +2.75
OS_ADD: +1.00
OD_ADD: +1.00
OS_SPHERE: +2.25
OS_CYLINDER: -0.75
OD_VA1: 20/20
OD_VA1: 20/20-
OS_CYLINDER: -0.75
OS_VA1: 20/20
OD_AXIS: 015
OS_AXIS: 160
OD_CYLINDER: -1.25
OS_VA1: 20/20
OD_CYLINDER: -1.50

## 2023-03-10 ASSESSMENT — CONFRONTATIONAL VISUAL FIELD TEST (CVF)
OS_FINDINGS: FULL
OD_FINDINGS: FULL

## 2023-03-10 ASSESSMENT — REFRACTION_AUTOREFRACTION
OS_SPHERE: +2.75
OD_SPHERE: +3.25
OS_AXIS: 160
OS_CYLINDER: -0.75
OD_AXIS: 012
OD_CYLINDER: -2.25

## 2023-03-10 ASSESSMENT — KERATOMETRY
OD_K1POWER_DIOPTERS: 41.25
OS_K1POWER_DIOPTERS: 42.25
OS_AXISANGLE_DEGREES: 065
OD_AXISANGLE_DEGREES: 095
OD_K2POWER_DIOPTERS: 43.50
OS_K2POWER_DIOPTERS: 42.75

## 2023-03-10 ASSESSMENT — REFRACTION_CURRENTRX
OS_VPRISM_DIRECTION: SV
OD_OVR_VA: 20/
OD_SPHERE: +1.75
OS_SPHERE: +1.75
OD_VPRISM_DIRECTION: SV
OS_OVR_VA: 20/
OD_CYLINDER: -1.25
OS_AXIS: 151
OS_CYLINDER: -0.50
OD_AXIS: 16

## 2023-03-10 ASSESSMENT — SPHEQUIV_DERIVED
OD_SPHEQUIV: 1.625
OS_SPHEQUIV: 2.375
OD_SPHEQUIV: 2.125
OD_SPHEQUIV: 2
OS_SPHEQUIV: 2.125
OS_SPHEQUIV: 1.875

## 2023-03-10 ASSESSMENT — AXIALLENGTH_DERIVED
OD_AL: 23.2315
OD_AL: 23.374
OD_AL: 23.1844
OS_AL: 23.1401
OS_AL: 23.0468
OS_AL: 23.2342

## 2023-03-10 ASSESSMENT — VISUAL ACUITY
OD_BCVA: 20/20-1
OS_BCVA: 20/20

## 2023-03-10 ASSESSMENT — TONOMETRY
OS_IOP_MMHG: 13
OD_IOP_MMHG: 15

## 2023-04-14 ENCOUNTER — APPOINTMENT (OUTPATIENT)
Dept: PULMONOLOGY | Facility: CLINIC | Age: 43
End: 2023-04-14
Payer: COMMERCIAL

## 2023-04-14 VITALS
HEART RATE: 90 BPM | WEIGHT: 145 LBS | SYSTOLIC BLOOD PRESSURE: 114 MMHG | HEIGHT: 64 IN | DIASTOLIC BLOOD PRESSURE: 68 MMHG | RESPIRATION RATE: 16 BRPM | BODY MASS INDEX: 24.75 KG/M2 | OXYGEN SATURATION: 98 %

## 2023-04-14 PROCEDURE — 99214 OFFICE O/P EST MOD 30 MIN: CPT

## 2023-04-14 RX ORDER — LEVALBUTEROL TARTRATE 45 UG/1
45 AEROSOL, METERED ORAL
Refills: 0 | Status: ACTIVE | COMMUNITY

## 2023-04-14 NOTE — REASON FOR VISIT
[Cough] : cough [Shortness of Breath] : shortness of breath [Wheezing] : wheezing [Follow-Up] : a follow-up visit [TextBox_44] : Prior Covid infection

## 2023-04-14 NOTE — DISCUSSION/SUMMARY
[FreeTextEntry1] : \par #1. Spirometry previously was essentially normal.\par #2. The patient does not appear to require chronic BD therapy at this time\par #3. Albuterol as needed\par #4. Pt should refrain from smoking, vapping, and marijuana use\par #5. On anxiolytics\par #6. Completed empiric abx and prednisone taper for possible URI\par #7. CXR to further evaluate cough was clear; consider CT for further eval\par #8. S/p Covid infection\par #9. F/u in 2 month with chest CT and PFTs\par #10. Consider further ENT evaluation for nasal/sinus causes of cough though overall much better with decreased marijuana use\par #11. Reviewed risks of exposure and symptoms of Covid-19 virus, including how the virus is spread and precautions to avoid vianey virus. \par \par The patient expressed understanding and agreement with the above recommendations/plan and accepts responsibility to be compliant with recommended testing, therapies, and f/u visits.\par All relevant questions and concerns were addressed.

## 2023-04-14 NOTE — HISTORY OF PRESENT ILLNESS
[Former] : former [TextBox_4] : S/p Covid infection 1/2022 with rhinorrhea but did not require therapy.\par Pt with cough and SOB for 2 months and was started on Albuterol therapy as needed with some improvement initially but is no longer helping. She was given steroids for 5 days without improvement but was not given abx.\par Pt had chronic cough even prior to current URI symptoms.\par H/o Flonase use for PNDS\par Pt reports significant decrease in marijuana use but reports breathing discomfort when she does use marijuana so recommended total cessation. [TextBox_11] : 1 [TextBox_13] : 25 [YearQuit] : 2022

## 2023-04-14 NOTE — CONSULT LETTER
[Dear  ___] : Dear  [unfilled], [Consult Letter:] : I had the pleasure of evaluating your patient, [unfilled]. [Please see my note below.] : Please see my note below. [Consult Closing:] : Thank you very much for allowing me to participate in the care of this patient.  If you have any questions, please do not hesitate to contact me. [Sincerely,] : Sincerely, [FreeTextEntry3] : Heber Childs MD, FCCP, D. ABSM\par Pulmonary and Sleep Medicine\par [facility] at [place]

## 2023-04-27 ENCOUNTER — NON-APPOINTMENT (OUTPATIENT)
Age: 43
End: 2023-04-27

## 2023-05-31 ENCOUNTER — OUTPATIENT (OUTPATIENT)
Dept: OUTPATIENT SERVICES | Facility: HOSPITAL | Age: 43
LOS: 1 days | End: 2023-05-31
Payer: COMMERCIAL

## 2023-05-31 ENCOUNTER — APPOINTMENT (OUTPATIENT)
Dept: CT IMAGING | Facility: CLINIC | Age: 43
End: 2023-05-31
Payer: COMMERCIAL

## 2023-05-31 DIAGNOSIS — Z98.890 OTHER SPECIFIED POSTPROCEDURAL STATES: Chronic | ICD-10-CM

## 2023-05-31 DIAGNOSIS — R05.9 COUGH, UNSPECIFIED: ICD-10-CM

## 2023-05-31 PROCEDURE — 71250 CT THORAX DX C-: CPT | Mod: 26

## 2023-05-31 PROCEDURE — 71250 CT THORAX DX C-: CPT

## 2023-06-15 ENCOUNTER — APPOINTMENT (OUTPATIENT)
Dept: PULMONOLOGY | Facility: CLINIC | Age: 43
End: 2023-06-15
Payer: COMMERCIAL

## 2023-06-15 VITALS — WEIGHT: 143 LBS | BODY MASS INDEX: 25.02 KG/M2 | HEIGHT: 63.5 IN

## 2023-06-15 VITALS
HEART RATE: 78 BPM | SYSTOLIC BLOOD PRESSURE: 120 MMHG | RESPIRATION RATE: 16 BRPM | DIASTOLIC BLOOD PRESSURE: 64 MMHG | OXYGEN SATURATION: 98 %

## 2023-06-15 PROCEDURE — 85018 HEMOGLOBIN: CPT | Mod: QW

## 2023-06-15 PROCEDURE — 94729 DIFFUSING CAPACITY: CPT

## 2023-06-15 PROCEDURE — 94010 BREATHING CAPACITY TEST: CPT

## 2023-06-15 PROCEDURE — 94727 GAS DIL/WSHOT DETER LNG VOL: CPT

## 2023-06-15 PROCEDURE — 99214 OFFICE O/P EST MOD 30 MIN: CPT | Mod: 25

## 2023-06-15 RX ORDER — AZITHROMYCIN 250 MG/1
250 TABLET, FILM COATED ORAL
Qty: 1 | Refills: 0 | Status: DISCONTINUED | COMMUNITY
Start: 2022-12-28 | End: 2023-06-15

## 2023-06-15 RX ORDER — PREDNISONE 10 MG/1
10 TABLET ORAL DAILY
Qty: 100 | Refills: 0 | Status: DISCONTINUED | COMMUNITY
Start: 2022-12-28 | End: 2023-06-15

## 2023-06-15 NOTE — CONSULT LETTER
[Dear  ___] : Dear  [unfilled], [Consult Letter:] : I had the pleasure of evaluating your patient, [unfilled]. [Please see my note below.] : Please see my note below. [Consult Closing:] : Thank you very much for allowing me to participate in the care of this patient.  If you have any questions, please do not hesitate to contact me. [Sincerely,] : Sincerely, [FreeTextEntry3] : Heber Childs MD, FCCP, D. ABSM\par Pulmonary and Sleep Medicine\par [facility] at [place]

## 2023-06-15 NOTE — DISCUSSION/SUMMARY
[FreeTextEntry1] : \par #1. Spirometry previously was essentially normal; repeat PFTs were normal.\par #2. The patient does not appear to require chronic BD therapy at this time\par #3. Albuterol as needed\par #4. Pt should refrain from smoking, vapping, and marijuana use given chest discomfort and SOB when she does smoke marijuana\par #5. On anxiolytics\par #6. Completed empiric abx and prednisone taper for possible URI\par #7. CXR to further evaluate cough was clear; consider CT for further eval\par #8. S/p Covid infection\par #9. F/u in 6 months\par #10. Consider further ENT evaluation for nasal/sinus causes of cough though overall much better with decreased marijuana use\par #11. GI f/u for enlarging hepatic hemangioma c/w 2015\par #12. Reviewed risks of exposure and symptoms of Covid-19 virus, including how the virus is spread and precautions to avoid vianey virus. \par \par The patient expressed understanding and agreement with the above recommendations/plan and accepts responsibility to be compliant with recommended testing, therapies, and f/u visits.\par All relevant questions and concerns were addressed.

## 2023-06-15 NOTE — REASON FOR VISIT
[Follow-Up] : a follow-up visit [Cough] : cough [Shortness of Breath] : shortness of breath [Wheezing] : wheezing [TextBox_44] : Prior Covid infection

## 2023-07-03 ENCOUNTER — APPOINTMENT (OUTPATIENT)
Dept: GASTROENTEROLOGY | Facility: CLINIC | Age: 43
End: 2023-07-03
Payer: COMMERCIAL

## 2023-07-03 ENCOUNTER — NON-APPOINTMENT (OUTPATIENT)
Age: 43
End: 2023-07-03

## 2023-07-03 VITALS
BODY MASS INDEX: 25.69 KG/M2 | HEART RATE: 83 BPM | HEIGHT: 63 IN | OXYGEN SATURATION: 98 % | WEIGHT: 145 LBS | SYSTOLIC BLOOD PRESSURE: 113 MMHG | DIASTOLIC BLOOD PRESSURE: 74 MMHG | RESPIRATION RATE: 14 BRPM

## 2023-07-03 PROCEDURE — 99205 OFFICE O/P NEW HI 60 MIN: CPT

## 2023-07-03 RX ORDER — ALPRAZOLAM 0.5 MG/1
0.5 TABLET ORAL
Qty: 15 | Refills: 0 | Status: ACTIVE | COMMUNITY
Start: 2023-06-07

## 2023-07-03 RX ORDER — PREDNISONE 50 MG/1
50 TABLET ORAL
Qty: 5 | Refills: 0 | Status: DISCONTINUED | COMMUNITY
Start: 2023-04-28 | End: 2023-07-03

## 2023-07-03 RX ORDER — CLINDAMYCIN PHOSPHATE 20 MG/G
2 CREAM VAGINAL
Qty: 40 | Refills: 0 | Status: DISCONTINUED | COMMUNITY
Start: 2023-02-27 | End: 2023-07-03

## 2023-07-03 RX ORDER — BUPROPION HYDROCHLORIDE 300 MG/1
300 TABLET, EXTENDED RELEASE ORAL
Qty: 45 | Refills: 0 | Status: ACTIVE | COMMUNITY
Start: 2023-06-21

## 2023-07-03 RX ORDER — ALPRAZOLAM 0.25 MG/1
0.25 TABLET ORAL
Qty: 30 | Refills: 0 | Status: ACTIVE | COMMUNITY
Start: 2023-03-01

## 2023-07-03 RX ORDER — SEMAGLUTIDE 2.4 MG/.75ML
2.4 INJECTION, SOLUTION SUBCUTANEOUS
Qty: 3 | Refills: 0 | Status: ACTIVE | COMMUNITY
Start: 2023-05-24

## 2023-07-03 NOTE — HISTORY OF PRESENT ILLNESS
[Tattoo] : tattoo(s) [Body Piercing] : body piercing [de-identified] : NILSON RASCON is a 43 year old female with a PMH significant for Anxiety.\par \par She presents today for evaluation of recent CT Chest findings of right hepatic hypodensity that is enlarged from 2015. Pt reports she was evaluated for a hepatic hemangioma in the past. 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 Denies fatigue, malaise, arthralgias, myalgias, pruritus, recent infection, abdominal pain or distension, jaundice, hematemesis, hematochezia, dark urine, confusion, unintentional weight loss or gain.

## 2023-07-03 NOTE — PHYSICAL EXAM
[General Appearance - Alert] : alert [Sclera] : the sclera and conjunctiva were normal [General Appearance - In No Acute Distress] : in no acute distress [Outer Ear] : the ears and nose were normal in appearance [Oropharynx] : the oropharynx was normal [Neck Appearance] : the appearance of the neck was normal [Bowel Sounds] : normal bowel sounds [Abdomen Soft] : soft [Abdomen Tenderness] : non-tender [Abdomen Mass (___ Cm)] : no abdominal mass palpated [Abnormal Walk] : normal gait [Skin Color & Pigmentation] : normal skin color and pigmentation [] : no rash [Skin Turgor] : normal skin turgor [No Focal Deficits] : no focal deficits [Oriented To Time, Place, And Person] : oriented to person, place, and time [Impaired Insight] : insight and judgment were intact [Affect] : the affect was normal [Scleral Icterus] : No Scleral Icterus [Spider Angioma] : No spider angioma(s) were observed [Abdominal  Ascites] : no ascites [Splenomegaly] : no splenomegaly [Asterixis] : no asterixis observed [Jaundice] : No jaundice [Depression] : no depression [Hallucinations] : ~T no ~M hallucinations

## 2023-07-03 NOTE — ADDENDUM
[FreeTextEntry1] : I, Rayne Varghese NP, acted as scribe for ROMERO Wasserman for this patient encounter.

## 2023-07-03 NOTE — ASSESSMENT
[FreeTextEntry1] : NILSON RASCON is a 43 year old female with a PMH significant for Anxiety.\par \par Abnormal CT findings\par CT Chest showed right hepatic hypodensity that is enlarged from 2015. Pt reports she was evaluated for a hepatic hemangioma in the past. MRI Abdomen w/wo IVC ordered to further evaluate. Labs ordered to r/o competing etiologies of liver disease.\par \par Follow up in 1 month

## 2023-07-05 LAB
A1AT SERPL-MCNC: 150 MG/DL
AFP-TM SERPL-MCNC: 2 NG/ML
ALBUMIN SERPL ELPH-MCNC: 4.6 G/DL
ALP BLD-CCNC: 62 U/L
ALT SERPL-CCNC: 20 U/L
ANION GAP SERPL CALC-SCNC: 16 MMOL/L
AST SERPL-CCNC: 20 U/L
BILIRUB DIRECT SERPL-MCNC: 0.1 MG/DL
BILIRUB INDIRECT SERPL-MCNC: 0.3 MG/DL
BILIRUB SERPL-MCNC: 0.4 MG/DL
BUN SERPL-MCNC: 16 MG/DL
CALCIUM SERPL-MCNC: 9.9 MG/DL
CERULOPLASMIN SERPL-MCNC: 23 MG/DL
CHLORIDE SERPL-SCNC: 105 MMOL/L
CO2 SERPL-SCNC: 20 MMOL/L
CREAT SERPL-MCNC: 0.63 MG/DL
EGFR: 111 ML/MIN/1.73M2
GLUCOSE SERPL-MCNC: 81 MG/DL
HBV CORE IGG+IGM SER QL: NONREACTIVE
HBV SURFACE AB SER QL: NONREACTIVE
HBV SURFACE AG SER QL: NONREACTIVE
HCV AB SER QL: NONREACTIVE
HCV S/CO RATIO: 0.1 S/CO
HEPATITIS A IGG ANTIBODY: NONREACTIVE
IGG SER QL IEP: 724 MG/DL
INR PPP: 1.08 RATIO
IRON SATN MFR SERPL: 42 %
IRON SERPL-MCNC: 160 UG/DL
NT-PROBNP SERPL-MCNC: 40 PG/ML
POTASSIUM SERPL-SCNC: 4.2 MMOL/L
PROT SERPL-MCNC: 6.7 G/DL
PT BLD: 12.5 SEC
SODIUM SERPL-SCNC: 141 MMOL/L
TIBC SERPL-MCNC: 386 UG/DL
TSH SERPL-ACNC: 1.25 UIU/ML
UIBC SERPL-MCNC: 225 UG/DL

## 2023-07-06 LAB
MITOCHONDRIA AB SER IF-ACNC: NORMAL
SMOOTH MUSCLE AB SER QL IF: NORMAL

## 2023-07-07 LAB
ANA SER IF-ACNC: NEGATIVE
LKM AB SER QL IF: <20.1 UNITS
SOLUBLE LIVER IGG SER IA-ACNC: 0.6

## 2023-08-01 ENCOUNTER — APPOINTMENT (OUTPATIENT)
Dept: MRI IMAGING | Facility: CLINIC | Age: 43
End: 2023-08-01
Payer: COMMERCIAL

## 2023-08-01 ENCOUNTER — OUTPATIENT (OUTPATIENT)
Dept: OUTPATIENT SERVICES | Facility: HOSPITAL | Age: 43
LOS: 1 days | End: 2023-08-01
Payer: COMMERCIAL

## 2023-08-01 DIAGNOSIS — R93.89 ABNORMAL FINDINGS ON DIAGNOSTIC IMAGING OF OTHER SPECIFIED BODY STRUCTURES: ICD-10-CM

## 2023-08-01 DIAGNOSIS — Z98.890 OTHER SPECIFIED POSTPROCEDURAL STATES: Chronic | ICD-10-CM

## 2023-08-01 PROCEDURE — 74183 MRI ABD W/O CNTR FLWD CNTR: CPT

## 2023-08-01 PROCEDURE — 74183 MRI ABD W/O CNTR FLWD CNTR: CPT | Mod: 26

## 2023-08-03 ENCOUNTER — APPOINTMENT (OUTPATIENT)
Dept: GASTROENTEROLOGY | Facility: CLINIC | Age: 43
End: 2023-08-03
Payer: COMMERCIAL

## 2023-08-03 ENCOUNTER — NON-APPOINTMENT (OUTPATIENT)
Age: 43
End: 2023-08-03

## 2023-08-03 VITALS
WEIGHT: 143 LBS | SYSTOLIC BLOOD PRESSURE: 123 MMHG | DIASTOLIC BLOOD PRESSURE: 88 MMHG | OXYGEN SATURATION: 98 % | TEMPERATURE: 97.6 F | RESPIRATION RATE: 14 BRPM | HEIGHT: 63 IN | HEART RATE: 69 BPM | BODY MASS INDEX: 25.34 KG/M2

## 2023-08-03 PROCEDURE — 99215 OFFICE O/P EST HI 40 MIN: CPT

## 2023-08-23 NOTE — PHYSICAL EXAM
[General Appearance - Alert] : alert [General Appearance - In No Acute Distress] : in no acute distress [Sclera] : the sclera and conjunctiva were normal [Outer Ear] : the ears and nose were normal in appearance [Oropharynx] : the oropharynx was normal [Neck Appearance] : the appearance of the neck was normal [Bowel Sounds] : normal bowel sounds [Abdomen Soft] : soft [Abdomen Tenderness] : non-tender [Abdomen Mass (___ Cm)] : no abdominal mass palpated [Abnormal Walk] : normal gait [Skin Color & Pigmentation] : normal skin color and pigmentation [Skin Turgor] : normal skin turgor [] : no rash [No Focal Deficits] : no focal deficits [Oriented To Time, Place, And Person] : oriented to person, place, and time [Impaired Insight] : insight and judgment were intact [Affect] : the affect was normal [Scleral Icterus] : No Scleral Icterus [Spider Angioma] : No spider angioma(s) were observed [Abdominal  Ascites] : no ascites [Splenomegaly] : no splenomegaly [Asterixis] : no asterixis observed [Jaundice] : No jaundice [Depression] : no depression [Hallucinations] : ~T no ~M hallucinations

## 2023-08-23 NOTE — HISTORY OF PRESENT ILLNESS
[Tattoo] : tattoo(s) [Body Piercing] : body piercing [de-identified] : NILSON RASCON is a 43 year old female with a PMH significant for Anxiety.\par  \par  She presents today for evaluation of recent CT Chest findings of right hepatic hypodensity that is enlarged from 2015. Pt reports she was evaluated for a hepatic hemangioma in the past. 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 Denies fatigue, malaise, arthralgias, myalgias, pruritus, recent infection, abdominal pain or distension, jaundice, hematemesis, hematochezia, dark urine, confusion, unintentional weight loss or gain.  [FreeTextEntry1] : NILSON RASCON is a 43 year old female with a PMH significant for Anxiety, hepatic Hemangioma who initially presented for evaluation of recent CT Chest findings of right hepatic hypodensity that is enlarged from 2015.  We ordered a dedicated abdominal study to evaluate it.  8/3/23 : Presents for f/u Below are the results for MRI ordered by us.  LIVER: Increased size of a subcapsular and partially exophytic right lobe 9.5 x 11 x 9.7 cm giant cavernous hemangioma involving segments 6, 7 and 5 which previously approximately measured 5 x 7.1 x 6.2 cm Increased size of left lobe segment 2 now 3.2 cm subcapsular hemangioma, previously subcentimeter Right lobe segment 7/8 approximately 1.5 cm hemangioma was also previously subcentimeter Few subcentimeter scattered additional hemangiomas and tiny cysts.  No history of liver disease other than the presence of hemangiomas.  Labs ordered to rule out various etiologies of liver disease are negative.  aFP was normal  7/4/23  She presents today for evaluation of recent CT Chest findings of right hepatic hypodensity that is enlarged from 2015. Pt reports she was evaluated for a hepatic hemangioma in the past. 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 Denies fatigue, malaise, arthralgias, myalgias, pruritus, recent infection, abdominal pain or distension, jaundice, hematemesis, hematochezia, dark urine, confusion, unintentional weight loss or gain.

## 2023-08-23 NOTE — REASON FOR VISIT
[Consultation] : a consultation visit [Follow-up] : a follow-up of an existing diagnosis [FreeTextEntry1] : abnormal chest ct

## 2023-08-23 NOTE — ASSESSMENT
[FreeTextEntry1] : NILSON RASCON is a 43 year old female with a PMH significant for Anxiety.  Liver lesion MRI shows significant growth of previously seen hepatic hemangiomas. Increased size of a subcapsular and partially exophytic right lobe 9.5 x 11 x 9.7 cm giant cavernous hemangioma involving segments 6, 7 and 5 which previously approximately measured 5 x 7.1 x 6.2 cm Increased size of left lobe segment 2 now 3.2 cm subcapsular hemangioma, previously subcentimeter Right lobe segment 7/8 approximately 1.5 cm hemangioma was also previously subcentimeter.  Asymptomatic  Discussed with radiology and get input from hepatobiliary surgery.

## 2023-08-31 ENCOUNTER — APPOINTMENT (OUTPATIENT)
Dept: TRANSPLANT | Facility: CLINIC | Age: 43
End: 2023-08-31

## 2023-12-08 ENCOUNTER — APPOINTMENT (OUTPATIENT)
Dept: PULMONOLOGY | Facility: CLINIC | Age: 43
End: 2023-12-08
Payer: COMMERCIAL

## 2023-12-08 VITALS
RESPIRATION RATE: 14 BRPM | HEIGHT: 63 IN | HEART RATE: 82 BPM | OXYGEN SATURATION: 96 % | BODY MASS INDEX: 23.57 KG/M2 | DIASTOLIC BLOOD PRESSURE: 52 MMHG | SYSTOLIC BLOOD PRESSURE: 106 MMHG | WEIGHT: 133 LBS

## 2023-12-08 PROCEDURE — 99214 OFFICE O/P EST MOD 30 MIN: CPT

## 2023-12-08 RX ORDER — VENLAFAXINE HYDROCHLORIDE 150 MG/1
150 CAPSULE, EXTENDED RELEASE ORAL
Refills: 0 | Status: ACTIVE | COMMUNITY

## 2024-01-13 ENCOUNTER — OUTPATIENT (OUTPATIENT)
Dept: OUTPATIENT SERVICES | Facility: HOSPITAL | Age: 44
LOS: 1 days | End: 2024-01-13
Payer: COMMERCIAL

## 2024-01-13 ENCOUNTER — APPOINTMENT (OUTPATIENT)
Dept: RADIOLOGY | Facility: CLINIC | Age: 44
End: 2024-01-13
Payer: COMMERCIAL

## 2024-01-13 DIAGNOSIS — Z98.890 OTHER SPECIFIED POSTPROCEDURAL STATES: Chronic | ICD-10-CM

## 2024-01-13 DIAGNOSIS — R06.2 WHEEZING: ICD-10-CM

## 2024-01-13 DIAGNOSIS — R05.9 COUGH, UNSPECIFIED: ICD-10-CM

## 2024-01-13 PROCEDURE — 71046 X-RAY EXAM CHEST 2 VIEWS: CPT

## 2024-01-13 PROCEDURE — 71046 X-RAY EXAM CHEST 2 VIEWS: CPT | Mod: 26

## 2024-01-19 ENCOUNTER — APPOINTMENT (OUTPATIENT)
Dept: PULMONOLOGY | Facility: CLINIC | Age: 44
End: 2024-01-19
Payer: COMMERCIAL

## 2024-01-19 VITALS — BODY MASS INDEX: 24.45 KG/M2 | WEIGHT: 138 LBS | HEIGHT: 63 IN

## 2024-01-19 VITALS
HEART RATE: 120 BPM | DIASTOLIC BLOOD PRESSURE: 64 MMHG | OXYGEN SATURATION: 99 % | SYSTOLIC BLOOD PRESSURE: 106 MMHG | RESPIRATION RATE: 16 BRPM

## 2024-01-19 DIAGNOSIS — R93.89 ABNORMAL FINDINGS ON DIAGNOSTIC IMAGING OF OTHER SPECIFIED BODY STRUCTURES: ICD-10-CM

## 2024-01-19 DIAGNOSIS — R06.02 SHORTNESS OF BREATH: ICD-10-CM

## 2024-01-19 DIAGNOSIS — Z87.898 PERSONAL HISTORY OF OTHER SPECIFIED CONDITIONS: ICD-10-CM

## 2024-01-19 DIAGNOSIS — F12.90 CANNABIS USE, UNSPECIFIED, UNCOMPLICATED: ICD-10-CM

## 2024-01-19 DIAGNOSIS — Z87.891 PERSONAL HISTORY OF NICOTINE DEPENDENCE: ICD-10-CM

## 2024-01-19 DIAGNOSIS — R05.9 COUGH, UNSPECIFIED: ICD-10-CM

## 2024-01-19 PROCEDURE — 94010 BREATHING CAPACITY TEST: CPT

## 2024-01-19 PROCEDURE — 99214 OFFICE O/P EST MOD 30 MIN: CPT | Mod: 25

## 2024-01-19 RX ORDER — TOPIRAMATE 50 MG/1
TABLET, FILM COATED ORAL
Refills: 0 | Status: ACTIVE | COMMUNITY

## 2024-01-19 NOTE — HISTORY OF PRESENT ILLNESS
[Former] : former [TextBox_4] : Former smoker of 1 ppd x 25 years, quit 2022. Quit vapping in 12/2023. S/p Covid infection 1/2022 with rhinorrhea but did not require therapy. Pt with cough and SOB for 2 months and was started on Albuterol therapy as needed with some improvement initially but is no longer helping. She was given steroids for 5 days without improvement but was not given abx. Pt had chronic cough even prior to current URI symptoms but now with resolution of cough and is at baseline. H/o Flonase use for PNDS Pt reports significant decrease in marijuana use but reports breathing discomfort when she does use marijuana or E-cigarettes so recommended total cessation again. She reports she quit 6 weeks ago and feels better. [TextBox_11] : 1 [TextBox_13] : 25 [YearQuit] : 2022

## 2024-01-19 NOTE — PHYSICAL EXAM
[No Acute Distress] : no acute distress [Normal Appearance] : normal appearance [Supple] : supple [Normal Rate/Rhythm] : normal rate/rhythm [Normal S1, S2] : normal s1, s2 [No Murmurs] : no murmurs [No Resp Distress] : no resp distress [No Acc Muscle Use] : no acc muscle use [Normal Rhythm and Effort] : normal rhythm and effort [Wheeze] : wheeze [Rhonchi] : rhonchi [No Abnormalities] : no abnormalities [Benign] : benign [Not Tender] : not tender [Soft] : soft [No Clubbing] : no clubbing [No Edema] : no edema [No Focal Deficits] : no focal deficits [Oriented x3] : oriented x3

## 2024-01-19 NOTE — CONSULT LETTER
[Dear  ___] : Dear  [unfilled], [Consult Letter:] : I had the pleasure of evaluating your patient, [unfilled]. [Please see my note below.] : Please see my note below. [Consult Closing:] : Thank you very much for allowing me to participate in the care of this patient.  If you have any questions, please do not hesitate to contact me. [Sincerely,] : Sincerely, [FreeTextEntry3] : Heber Childs MD, FCCP, D. ABSM\par  Pulmonary and Sleep Medicine\par  [facility] at [place]

## 2024-01-19 NOTE — END OF VISIT
[Time Spent: ___ minutes] : I have spent [unfilled] minutes of time on the encounter. [TextEntry] : Discussed with pt at length regarding cough, sob, wheeze, smoking hx, prior Covid infection; reviewed w/u with pt as above.

## 2024-01-19 NOTE — RESULTS/DATA
[TextEntry] : CXR 12/28/22 was clear. Chest CT from 5/31/23 was clear but with an enlarging hepatic hemangioma which she will f/u with PCP.

## 2024-01-19 NOTE — DISCUSSION/SUMMARY
[FreeTextEntry1] : #1. Spirometry previously was essentially normal; repeat PFTs were normal. Repeat alisa again normal. #2. The patient does not appear to require chronic BD therapy at this time. #3. Albuterol as needed. #4. Pt should refrain from smoking, vapping, and marijuana use given chest discomfort and SOB when she does smoke marijuana. She reports she now longer vapes and quit smoking in 2022. #5. On anxiolytics. #6. Completed empiric abx and prednisone taper for possible URI previously and then completed Medrol dose gareth for mild wheeze/rhonchi on exam. She is now better and back to baseline. #7. CXR to further evaluate cough was clear and repeat was again clear. #8. S/p Covid infection. #9. F/u as needed. #10. Consider further ENT evaluation for nasal/sinus causes of cough though overall much better with decreased marijuana use and now resolution of use. #11. GI/PCP f/u for enlarging hepatic hemangioma c/w 2015.  The patient expressed understanding and agreement with the above recommendations/plan and accepts responsibility to be compliant with recommended testing, therapies, and f/u visits. All relevant questions and concerns were addressed.

## 2024-04-11 RX ORDER — ALBUTEROL SULFATE 90 UG/1
108 (90 BASE) AEROSOL, METERED RESPIRATORY (INHALATION)
Qty: 3 | Refills: 3 | Status: ACTIVE | COMMUNITY
Start: 1900-01-01 | End: 1900-01-01

## 2024-04-18 RX ORDER — METHYLPREDNISOLONE 4 MG/1
4 TABLET ORAL
Qty: 1 | Refills: 0 | Status: ACTIVE | COMMUNITY
Start: 2023-12-08 | End: 1900-01-01

## 2024-07-23 NOTE — ASU PATIENT PROFILE, ADULT - SURGICAL SITE INCISION
no

## 2024-11-21 ENCOUNTER — RESULT CHARGE (OUTPATIENT)
Age: 44
End: 2024-11-21

## 2024-11-21 ENCOUNTER — NON-APPOINTMENT (OUTPATIENT)
Age: 44
End: 2024-11-21

## 2024-11-21 ENCOUNTER — APPOINTMENT (OUTPATIENT)
Dept: PULMONOLOGY | Facility: CLINIC | Age: 44
End: 2024-11-21
Payer: COMMERCIAL

## 2024-11-21 VITALS
SYSTOLIC BLOOD PRESSURE: 122 MMHG | WEIGHT: 156 LBS | DIASTOLIC BLOOD PRESSURE: 74 MMHG | HEIGHT: 64 IN | BODY MASS INDEX: 26.63 KG/M2 | OXYGEN SATURATION: 98 % | HEART RATE: 90 BPM | RESPIRATION RATE: 16 BRPM

## 2024-11-21 DIAGNOSIS — Z87.891 PERSONAL HISTORY OF NICOTINE DEPENDENCE: ICD-10-CM

## 2024-11-21 DIAGNOSIS — R06.2 WHEEZING: ICD-10-CM

## 2024-11-21 DIAGNOSIS — R06.02 SHORTNESS OF BREATH: ICD-10-CM

## 2024-11-21 DIAGNOSIS — J45.909 UNSPECIFIED ASTHMA, UNCOMPLICATED: ICD-10-CM

## 2024-11-21 DIAGNOSIS — R93.89 ABNORMAL FINDINGS ON DIAGNOSTIC IMAGING OF OTHER SPECIFIED BODY STRUCTURES: ICD-10-CM

## 2024-11-21 DIAGNOSIS — E66.3 OVERWEIGHT: ICD-10-CM

## 2024-11-21 DIAGNOSIS — F12.90 CANNABIS USE, UNSPECIFIED, UNCOMPLICATED: ICD-10-CM

## 2024-11-21 DIAGNOSIS — R05.9 COUGH, UNSPECIFIED: ICD-10-CM

## 2024-11-21 PROCEDURE — 99215 OFFICE O/P EST HI 40 MIN: CPT | Mod: 25

## 2024-11-21 PROCEDURE — 94010 BREATHING CAPACITY TEST: CPT

## 2024-11-21 RX ORDER — TIRZEPATIDE 5 MG/.5ML
5 INJECTION, SOLUTION SUBCUTANEOUS
Refills: 0 | Status: ACTIVE | COMMUNITY

## 2024-11-21 RX ORDER — PREDNISONE 10 MG/1
10 TABLET ORAL DAILY
Qty: 100 | Refills: 1 | Status: ACTIVE | COMMUNITY
Start: 2024-11-21 | End: 1900-01-01

## 2024-11-21 RX ORDER — FLUTICASONE FUROATE AND VILANTEROL TRIFENATATE 200; 25 UG/1; UG/1
200-25 POWDER RESPIRATORY (INHALATION)
Qty: 1 | Refills: 2 | Status: ACTIVE | COMMUNITY
Start: 2024-11-21 | End: 1900-01-01

## 2025-01-27 NOTE — ASU PATIENT PROFILE, ADULT - IS PATIENT PREGNANT?
Procedure:  EXTRACTION EXTRACAPSULAR CATARACT PHACO INTRAOCULAR LENS (IOL) (Right: Eye)    Relevant Problems   No relevant active problems        H/O TIA    Physical Exam    Airway    Mallampati score: II  TM Distance: >3 FB  Neck ROM: full     Dental   No notable dental hx     Cardiovascular  Cardiovascular exam normal    Pulmonary  Pulmonary exam normal     Other Findings        Anesthesia Plan  ASA Score- 2     Anesthesia Type- IV sedation with anesthesia with ASA Monitors.         Additional Monitors:     Airway Plan:            Plan Factors-Exercise tolerance (METS): >4 METS.    Chart reviewed.   Existing labs reviewed. Patient summary reviewed.    Patient is not a current smoker.      Obstructive sleep apnea risk education given perioperatively.        Induction-     Postoperative Plan-     Perioperative Resuscitation Plan - Level 1 - Full Code.       Informed Consent- Anesthetic plan and risks discussed with patient.  I personally reviewed this patient with the CRNA. Discussed and agreed on the Anesthesia Plan with the CRNA..      NPO Status:  Vitals Value Taken Time   Date of last liquid 01/27/25 01/27/25 0838   Time of last liquid 0600 01/27/25 0838   Date of last solid 01/26/25 01/27/25 0838   Time of last solid 1700 01/27/25 0838          no

## 2025-02-18 ENCOUNTER — RX RENEWAL (OUTPATIENT)
Age: 45
End: 2025-02-18

## 2025-05-28 ENCOUNTER — APPOINTMENT (OUTPATIENT)
Dept: PULMONOLOGY | Facility: CLINIC | Age: 45
End: 2025-05-28

## 2025-09-02 ENCOUNTER — RX RENEWAL (OUTPATIENT)
Age: 45
End: 2025-09-02